# Patient Record
Sex: MALE
[De-identification: names, ages, dates, MRNs, and addresses within clinical notes are randomized per-mention and may not be internally consistent; named-entity substitution may affect disease eponyms.]

---

## 2021-12-20 ENCOUNTER — DASHBOARD ENCOUNTERS (OUTPATIENT)
Age: 61
End: 2021-12-20

## 2021-12-21 ENCOUNTER — OFFICE VISIT (OUTPATIENT)
Dept: URBAN - METROPOLITAN AREA CLINIC 70 | Facility: CLINIC | Age: 61
End: 2021-12-21

## 2021-12-21 NOTE — HPI-TODAY'S VISIT:
Patient referred by Alice Berry NP to schedule colonoscopy for colorectal cancer screening.  A copy of this note will be sent to the referring provider.  Currently undergoing work-up for renal transplant.